# Patient Record
Sex: MALE | Race: WHITE | Employment: FULL TIME | URBAN - METROPOLITAN AREA
[De-identification: names, ages, dates, MRNs, and addresses within clinical notes are randomized per-mention and may not be internally consistent; named-entity substitution may affect disease eponyms.]

---

## 2019-08-13 ENCOUNTER — APPOINTMENT (OUTPATIENT)
Dept: LAB | Age: 29
End: 2019-08-13
Attending: NURSE PRACTITIONER
Payer: COMMERCIAL

## 2019-08-13 PROBLEM — Z79.899 HIGH RISK MEDICATION USE: Status: ACTIVE | Noted: 2017-07-28

## 2019-08-13 PROCEDURE — 36415 COLL VENOUS BLD VENIPUNCTURE: CPT | Performed by: NURSE PRACTITIONER

## 2019-08-13 PROCEDURE — 80053 COMPREHEN METABOLIC PANEL: CPT | Performed by: NURSE PRACTITIONER

## 2019-08-13 PROCEDURE — 85025 COMPLETE CBC W/AUTO DIFF WBC: CPT | Performed by: NURSE PRACTITIONER

## 2019-08-13 NOTE — PROGRESS NOTES
HPI:    Patient ID: Carrol Vazquez is a 33year old male. HPI     Recently moved to [facility] and looking to get established with provider. Was seen in [facility]. Has hx of bipolar. Notes a difference when coming off the medication from past history. and affect. His speech is normal and behavior is normal. Judgment and thought content normal. Cognition and memory are normal.   Decreased concentration   Nursing note and vitals reviewed.              ASSESSMENT/PLAN:   Bipolar 2 disorder (hcc)  (primary e

## 2019-08-13 NOTE — PATIENT INSTRUCTIONS
Contact pharmacy (local and mail order) to see where we can get the [facility] . Labs pending. Schedule appointment with PCP of choice for physical.   Follow-up sooner if needed.

## 2019-08-14 ENCOUNTER — PATIENT MESSAGE (OUTPATIENT)
Dept: FAMILY MEDICINE CLINIC | Facility: CLINIC | Age: 29
End: 2019-08-14

## 2019-08-15 NOTE — TELEPHONE ENCOUNTER
From: Chris Hui  To: ZAHIDA Florian  Sent: 8/14/2019 8:34 PM CDT  Subject: Visit Follow-up Question    I spoke with the pharmacist. She is able to get the brand of lamotragine that I need.  Apparently I'm up for a refill soon, but I will let you k

## 2019-08-21 ENCOUNTER — OFFICE VISIT (OUTPATIENT)
Dept: FAMILY MEDICINE CLINIC | Facility: CLINIC | Age: 29
End: 2019-08-21

## 2019-08-21 VITALS
SYSTOLIC BLOOD PRESSURE: 126 MMHG | BODY MASS INDEX: 27.37 KG/M2 | DIASTOLIC BLOOD PRESSURE: 82 MMHG | HEIGHT: 74 IN | WEIGHT: 213.25 LBS | TEMPERATURE: 98 F | HEART RATE: 100 BPM | RESPIRATION RATE: 18 BRPM

## 2019-08-21 DIAGNOSIS — I45.6 WPW (WOLFF-PARKINSON-WHITE SYNDROME): ICD-10-CM

## 2019-08-21 DIAGNOSIS — Z00.00 PHYSICAL EXAM: Primary | ICD-10-CM

## 2019-08-21 DIAGNOSIS — F31.81 BIPOLAR 2 DISORDER (HCC): ICD-10-CM

## 2019-08-21 DIAGNOSIS — M41.9 SCOLIOSIS OF LUMBAR SPINE, UNSPECIFIED SCOLIOSIS TYPE: ICD-10-CM

## 2019-08-21 DIAGNOSIS — Z91.09 ENVIRONMENTAL ALLERGIES: ICD-10-CM

## 2019-08-21 PROCEDURE — 99395 PREV VISIT EST AGE 18-39: CPT | Performed by: FAMILY MEDICINE

## 2019-08-21 NOTE — PATIENT INSTRUCTIONS
Continue current medications  Doing well today. No contraindication to donate plasma. Cbc and cmp done recently.

## 2019-08-21 NOTE — PROGRESS NOTES
[address] [place] [address]    Chief Complaint:   Patient presents with:  Physical  Establish Care      HPI:   Drew Kramer is a 33year old male who presents for an Annual Health Visit. Patient presents to establish care.   Has history of bipolar 2 d throat; hearing loss negative  RESPIRATORY: denies shortness of breath, wheezing or cough   CARDIOVASCULAR: denies chest pain or SPARKS; no palpitations   GI: denies nausea, vomiting, constipation, diarrhea; no rectal bleeding; no heartburn  GENITAL/: no dy and oriented x 3; affect appropriate        ASSESSMENT AND PLAN:   Sujata Barry was seen today for physical and establish care.     Diagnoses and all orders for this visit:    Physical exam    Bipolar 2 disorder ([facility] [address]    Environmental allergies    WPW (Twan

## 2019-08-28 ENCOUNTER — OFFICE VISIT (OUTPATIENT)
Dept: FAMILY MEDICINE CLINIC | Facility: CLINIC | Age: 29
End: 2019-08-28

## 2019-08-28 VITALS
BODY MASS INDEX: 27.46 KG/M2 | SYSTOLIC BLOOD PRESSURE: 130 MMHG | RESPIRATION RATE: 16 BRPM | DIASTOLIC BLOOD PRESSURE: 80 MMHG | TEMPERATURE: 97 F | HEART RATE: 86 BPM | OXYGEN SATURATION: 98 % | WEIGHT: 214 LBS | HEIGHT: 74 IN

## 2019-08-28 DIAGNOSIS — R21 RASH AND NONSPECIFIC SKIN ERUPTION: Primary | ICD-10-CM

## 2019-08-28 PROCEDURE — 99213 OFFICE O/P EST LOW 20 MIN: CPT | Performed by: FAMILY MEDICINE

## 2019-08-28 NOTE — PATIENT INSTRUCTIONS
Possibly rash from medication. Also could be due to fungal infection. Recommend to keep area dry as possible. Will consider treatment with nystatin powder if rash returns. Return to clinic if any concern.

## 2019-08-28 NOTE — PROGRESS NOTES
[facility] [place]  PROGRESS NOTE  Chief Complaint:   Patient presents with:  Rash      HPI:   This is a 30year old male presents to clinic for evaluation of her rash.   Initially patient noticed rash last week after that was changing his lamotr vomiting, constipation, diarrhea, or blood in stool. MUSCULOSKELETAL:  Denies weakness, muscle aches, back pain, joint pain, swelling or stiffness. HEMATOLOGIC:  Denies anemia, bleeding or bruising. PSYCHIATRIC:  Denies depression or anxiety.       EXAM: understanding. Patient is notified to call with any questions, complications, allergies, or worsening or changing symptoms. Patient is to call with any side effects or complications from the treatments as a result of today.      Problem List:  Patient Acti

## 2019-11-06 ENCOUNTER — PATIENT MESSAGE (OUTPATIENT)
Dept: FAMILY MEDICINE CLINIC | Facility: CLINIC | Age: 29
End: 2019-11-06

## 2019-11-08 ENCOUNTER — APPOINTMENT (OUTPATIENT)
Dept: LAB | Age: 29
End: 2019-11-08
Attending: FAMILY MEDICINE
Payer: COMMERCIAL

## 2019-11-08 DIAGNOSIS — Z91.09 ENVIRONMENTAL ALLERGIES: ICD-10-CM

## 2019-11-08 DIAGNOSIS — L50.9 HIVES: ICD-10-CM

## 2019-11-08 PROCEDURE — 86003 ALLG SPEC IGE CRUDE XTRC EA: CPT

## 2019-11-08 PROCEDURE — 36415 COLL VENOUS BLD VENIPUNCTURE: CPT

## 2019-11-08 PROCEDURE — 82785 ASSAY OF IGE: CPT

## 2019-11-08 NOTE — PATIENT INSTRUCTIONS
From filled out for [employer]. Check allergy testing today. Have benadryl available if symptoms returns. Go to ER if symptoms worse. Return to clinic if any concern.

## 2019-11-08 NOTE — PROGRESS NOTES
[address]  PROGRESS NOTE  Chief Complaint:   Patient presents with: Other: Discuss [employer]      HPI:   This is a 30year old male presents to clinic to have form filled out for plasma donation.   Patient has history of bipolar 2 disorde lesion, or excessive skin dryness. CARDIOVASCULAR:  Denies chest pain, chest pressure, chest discomfort, palpitations, edema, dyspnea on exertion or at rest.  RESPIRATORY:  Denies shortness of breath, wheezing, cough or sputum.   GASTROINTESTINAL:  Denies PSYCHIATRIC: alert and oriented x 3; affect appropriate          ASSESSMENT AND PLAN:   Carlos Pelletier was seen today for other.     Diagnoses and all orders for this visit:    Bipolar 2 disorder ([facility] [address]    Environmental allergies  -     ALLERGY REGION 8; Future  -

## 2019-11-11 ENCOUNTER — TELEPHONE (OUTPATIENT)
Dept: FAMILY MEDICINE CLINIC | Facility: CLINIC | Age: 29
End: 2019-11-11

## 2019-11-11 NOTE — TELEPHONE ENCOUNTER
----- Message from Feng Wilson MD sent at 11/11/2019  4:14 PM CST -----  Please inform patient that food allergy profile is negative. He has allergies to Dog dander, fungus, dust mites, Ragweed  Recommend to return to clinic if symptoms worse.

## 2019-11-11 NOTE — TELEPHONE ENCOUNTER
Message left on confirmed voicemail with results and recommendations as per Dr. Kevin Swenson. Pt encouraged to call back with any questions.

## 2019-12-10 ENCOUNTER — OFFICE VISIT (OUTPATIENT)
Dept: FAMILY MEDICINE CLINIC | Facility: CLINIC | Age: 29
End: 2019-12-10

## 2019-12-10 VITALS
DIASTOLIC BLOOD PRESSURE: 76 MMHG | HEART RATE: 78 BPM | WEIGHT: 219 LBS | TEMPERATURE: 97 F | BODY MASS INDEX: 28 KG/M2 | SYSTOLIC BLOOD PRESSURE: 112 MMHG | OXYGEN SATURATION: 98 %

## 2019-12-10 DIAGNOSIS — R05.9 COUGH: Primary | ICD-10-CM

## 2019-12-10 PROBLEM — Z79.899 HIGH RISK MEDICATION USE: Status: RESOLVED | Noted: 2017-07-28 | Resolved: 2019-12-10

## 2019-12-10 PROCEDURE — 99213 OFFICE O/P EST LOW 20 MIN: CPT | Performed by: NURSE PRACTITIONER

## 2019-12-10 RX ORDER — BENZONATATE 200 MG/1
200 CAPSULE ORAL 3 TIMES DAILY PRN
Qty: 50 CAPSULE | Refills: 0 | Status: SHIPPED | OUTPATIENT
Start: 2019-12-10 | End: 2019-12-30

## 2019-12-10 NOTE — PROGRESS NOTES
HPI:    Patient ID: Ivonne Oconnor is a 30year old male. HPI     Cough for the past 6 days that is not getting better. Has tried cough drops and NyQuil if unable to sleep. States otherwise feels ok. Slight PND.    No fever, chills, ear pain, or ST.   Nolon Kail Neck: Normal range of motion. Neck supple. No thyromegaly present. Cardiovascular: Normal rate, regular rhythm and normal heart sounds. Exam reveals no friction rub. No murmur heard.   Pulmonary/Chest: Effort normal and breath sounds normal. No respirat Take this medicine by mouth with a glass of water. Follow the directions on the prescription label. Avoid breaking, chewing, or sucking the capsule, as this can cause serious side effects. Take your medicine at regular intervals.  Do not take your medicine · an unusual or allergic reaction to benzonatate, anesthetics, other medicines, foods, dyes, or preservatives  · pregnant or trying to get pregnant  · breast-feeding  What should I watch for while using this medicine?   Tell your doctor if your symptoms do

## 2019-12-10 NOTE — PATIENT INSTRUCTIONS
Take Tessalon three times a day as needed for the cough.      Take acetaminophen or ibuprofen for fever/discomfort  Drink plenty of fluids, warm liquids  Decongestants for congestion  Expectorant and/or cough suppressant  Use saline drops as needed or use n Carbex, Eldepryl, Marplan, Nardil, and Parnate  What if I miss a dose? If you miss a dose, take it as soon as you can. If it is almost time for your next dose, take only that dose. Do not take double or extra doses. Where should I keep my medicine?   Keep

## 2019-12-30 ENCOUNTER — OFFICE VISIT (OUTPATIENT)
Dept: FAMILY MEDICINE CLINIC | Facility: CLINIC | Age: 29
End: 2019-12-30

## 2019-12-30 VITALS
WEIGHT: 218.38 LBS | RESPIRATION RATE: 18 BRPM | DIASTOLIC BLOOD PRESSURE: 72 MMHG | TEMPERATURE: 97 F | HEART RATE: 84 BPM | OXYGEN SATURATION: 95 % | HEIGHT: 74 IN | SYSTOLIC BLOOD PRESSURE: 116 MMHG | BODY MASS INDEX: 28.03 KG/M2

## 2019-12-30 DIAGNOSIS — J98.01 BRONCHOSPASM, ACUTE: Primary | ICD-10-CM

## 2019-12-30 PROCEDURE — 99213 OFFICE O/P EST LOW 20 MIN: CPT | Performed by: NURSE PRACTITIONER

## 2019-12-30 RX ORDER — ALBUTEROL SULFATE 90 UG/1
2 AEROSOL, METERED RESPIRATORY (INHALATION) EVERY 4 HOURS PRN
Qty: 1 INHALER | Refills: 0 | Status: SHIPPED | OUTPATIENT
Start: 2019-12-30 | End: 2021-04-08

## 2019-12-30 RX ORDER — BENZONATATE 200 MG/1
200 CAPSULE ORAL 3 TIMES DAILY PRN
Qty: 50 CAPSULE | Refills: 0 | Status: SHIPPED | OUTPATIENT
Start: 2019-12-30 | End: 2020-05-01 | Stop reason: ALTCHOICE

## 2019-12-30 RX ORDER — EPINEPHRINE 0.3 MG/.3ML
0.3 INJECTION SUBCUTANEOUS ONCE
Qty: 1 EACH | Refills: 0 | Status: SHIPPED | OUTPATIENT
Start: 2019-12-30 | End: 2019-12-30

## 2019-12-30 NOTE — PROGRESS NOTES
HPI:    Patient ID: Enola Sacks is a 30year old male. HPI     States that the cough remains from 2 weeks ago. States that in general he feels well. States the Tessalon helps the cough until it wears off. Cough is dry. No congestion noted.    No GI i canal normal.   Left Ear: Hearing, tympanic membrane, external ear and ear canal normal.   Nose: Mucosal edema (left > right) present. Right sinus exhibits no maxillary sinus tenderness and no frontal sinus tenderness.  Left sinus exhibits no maxillary sinu Follow-up if not improving - consider PFT and/or chest x-ray. Otherwise follow-up as needed.             MO#[id number]

## 2019-12-30 NOTE — PATIENT INSTRUCTIONS
Use inhaler every 4 hours as needed. Refill done on epipen and tessalon. Follow-up if not improving - consider PFT and/or chest x-ray. Otherwise follow-up as needed.

## 2020-01-14 NOTE — TELEPHONE ENCOUNTER
Albuteral Inhaler LR - 12/30/19, 1 Inhaler, 0 refills    LOV - 12/30/19 Cough   8/21/19 Physical with Dr. Isabell Schwarz - Not Scheduled    Future appt:    Last Appointment with provider:   12/30/2019  Last appointment at [facility]:  12/30/2019  No results

## 2020-01-15 NOTE — TELEPHONE ENCOUNTER
Patient was given an inhaler a couple of weeks ago. Please clarify about the need for another inhaler - it's really too soon. Thank you.

## 2020-01-27 NOTE — TELEPHONE ENCOUNTER
Future appt:    Last Appointment with provider:   12/30/2019  Last appointment at [facility]:  12/30/2019  No results found for: CHOLEST, HDL, LDL, TRIGLY, TRIG  No results found for: EAG, A1C  No results found for: T4F, TSH, TSHT4    No follow-ups on fi

## 2020-01-27 NOTE — TELEPHONE ENCOUNTER
Please clarify if patient really needs a refill on his albuterol. If he has used 1 inhaler in the past month, needs follow-up to do the PFT and chest x-ray.

## 2020-02-07 ENCOUNTER — PATIENT MESSAGE (OUTPATIENT)
Dept: FAMILY MEDICINE CLINIC | Facility: CLINIC | Age: 30
End: 2020-02-07

## 2020-02-08 NOTE — TELEPHONE ENCOUNTER
Dr. Britney Reese can you please advise on patient's below request.     Future appt:    Last Appointment with provider: 8/21/19 physical; return in 6 months for f/u  Last appointment at [facility]:  12/30/2019  No results found for: Hayde Morillo, HDL, LDL, Mirian Sprague, T

## 2020-02-08 NOTE — TELEPHONE ENCOUNTER
From: Maria Isabel Rodriguez  To: Iban Granados, APRN  Sent: 2/7/2020 11:21 AM CST  Subject: Prescription Question    Can you please approve my Lamictal ASAP?  I'm leaving for a conference on Monday and I need to fill it before then

## 2020-02-10 RX ORDER — LAMOTRIGINE 200 MG/1
200 TABLET ORAL
Qty: 30 TABLET | Refills: 0 | Status: SHIPPED | OUTPATIENT
Start: 2020-02-10 | End: 2020-03-19

## 2020-02-10 NOTE — TELEPHONE ENCOUNTER
I have sent refill. Please ask patient if he is seeing psychiatrist currently or not for future refills.

## 2020-02-18 ENCOUNTER — PATIENT MESSAGE (OUTPATIENT)
Dept: FAMILY MEDICINE CLINIC | Facility: CLINIC | Age: 30
End: 2020-02-18

## 2020-02-18 NOTE — TELEPHONE ENCOUNTER
From: Maria Isabel Rodriguez  To: Araceli New MD  Sent: 2/18/2020 10:20 AM CST  Subject: Referral Request    Can I please get a referral to a psychiatrist for my MARES consult? I'm assuming there's someone within our Network and in our area?     Also I just realized t

## 2020-02-19 NOTE — TELEPHONE ENCOUNTER
From: Emiliano Castellanos  To: Manfred Gasca MD  Sent: 2/18/2020 1:12 PM CST  Subject: Referral Request    Will you please refer me to an in-network psychiatrist in [facility] as discussed? Can you also please refer me to a new cardiologist within our network?

## 2020-03-19 RX ORDER — LAMOTRIGINE 200 MG/1
TABLET ORAL
Qty: 90 TABLET | Refills: 0 | Status: SHIPPED | OUTPATIENT
Start: 2020-03-19 | End: 2020-06-15

## 2020-03-19 NOTE — TELEPHONE ENCOUNTER
Future appt:     Your appointments     Date & Time Appointment Department [facility])    Apr 08, 2020  2:00 PM CDT [facility] EVAL with Yamilex Sandoval PA-C [address] (Jodee Camejo )            Thony Guevara

## 2020-05-01 ENCOUNTER — TELEPHONE (OUTPATIENT)
Dept: FAMILY MEDICINE CLINIC | Facility: CLINIC | Age: 30
End: 2020-05-01

## 2020-05-01 ENCOUNTER — OFFICE VISIT (OUTPATIENT)
Dept: FAMILY MEDICINE CLINIC | Facility: CLINIC | Age: 30
End: 2020-05-01

## 2020-05-01 VITALS
HEART RATE: 68 BPM | DIASTOLIC BLOOD PRESSURE: 60 MMHG | SYSTOLIC BLOOD PRESSURE: 80 MMHG | TEMPERATURE: 98 F | WEIGHT: 198 LBS | BODY MASS INDEX: 25.41 KG/M2 | HEIGHT: 74 IN | OXYGEN SATURATION: 98 % | RESPIRATION RATE: 16 BRPM

## 2020-05-01 DIAGNOSIS — H83.09 LABYRINTHITIS, UNSPECIFIED LATERALITY: Primary | ICD-10-CM

## 2020-05-01 PROCEDURE — 99214 OFFICE O/P EST MOD 30 MIN: CPT | Performed by: NURSE PRACTITIONER

## 2020-05-01 RX ORDER — MECLIZINE HCL 12.5 MG/1
12.5 TABLET ORAL 3 TIMES DAILY PRN
Qty: 30 TABLET | Refills: 0 | Status: SHIPPED | OUTPATIENT
Start: 2020-05-01 | End: 2021-04-08

## 2020-05-01 RX ORDER — EPINEPHRINE 0.3 MG/.3ML
INJECTION SUBCUTANEOUS
COMMUNITY
Start: 2019-12-30 | End: 2021-04-08

## 2020-05-01 NOTE — TELEPHONE ENCOUNTER
Patient agreed and will come into the office for visit.      Future Appointments   Date Time Provider Phyllis Nielsen   5/1/2020  1:30 PM ZAHIDA [facility] [place]   7/10/2020 12:30 PM Octavia Blair PA-C [address] [place] [address]

## 2020-05-01 NOTE — PATIENT INSTRUCTIONS
Discussed that labyrinthitis is an infection of the inner ear- typically it is viral and just takes time to resolve. You should turn your head slowly, and not make any sudden movements.   Also avoid motion rides such as roller coasters, and going upside suresh

## 2020-05-01 NOTE — TELEPHONE ENCOUNTER
Patient states that he is very lightheaded and dizzy today. Patient states that this started last night and then this morning he woke up and was spinning. Patient denied any fainting or loss of consciences. Requested a phone visit.      Drew Kramer

## 2020-05-01 NOTE — PROGRESS NOTES
Lauren Larson is a 30year old male. Patient presents with:  Dizziness: x 1 day      HPI:   Complaints of dizzy - felt like he was \"wasted\" room spinning. Patient states he was not inebriated in any way. No tunnel vision.    Last night symptoms started Depression Mother    • Depression Father    • Other (lung cancer) Maternal Grandmother    • Depression Paternal Grandmother    • Depression Paternal Grandfather    • Schizophrenia Maternal Uncle    • Depression Paternal Uncle    • Depression Paternal Uncle encounter. Meds & Refills for this Visit:  Requested Prescriptions     Signed Prescriptions Disp Refills   • Meclizine HCl 12.5 MG Oral Tab 30 tablet 0     Sig: Take 1 tablet (12.5 mg total) by mouth 3 (three) times daily as needed for Dizziness.

## 2020-05-27 ENCOUNTER — PATIENT MESSAGE (OUTPATIENT)
Dept: FAMILY MEDICINE CLINIC | Facility: CLINIC | Age: 30
End: 2020-05-27

## 2020-05-28 ENCOUNTER — VIRTUAL PHONE E/M (OUTPATIENT)
Dept: FAMILY MEDICINE CLINIC | Facility: CLINIC | Age: 30
End: 2020-05-28

## 2020-05-28 VITALS — HEIGHT: 73 IN | BODY MASS INDEX: 25.98 KG/M2 | WEIGHT: 196 LBS

## 2020-05-28 DIAGNOSIS — N20.0 BILATERAL KIDNEY STONES: Primary | ICD-10-CM

## 2020-05-28 PROCEDURE — 99213 OFFICE O/P EST LOW 20 MIN: CPT | Performed by: FAMILY MEDICINE

## 2020-05-28 NOTE — TELEPHONE ENCOUNTER
From: Edgardo Smyth  To: Yasmine Bedolla MD  Sent: 5/27/2020 5:37 PM CDT  Subject: Referral Request    Went to ER for pain in right flank, diagnosed with kidney stone. Requesting in-network urologist for follow up.

## 2020-05-28 NOTE — PATIENT INSTRUCTIONS
-Recommend to continue with plenty of fluids, avoid holding urine for long. Time. Follow-up with urologist.  Call back if any pain or any concern.

## 2020-05-28 NOTE — TELEPHONE ENCOUNTER
Edgardo Smyth  verbally consents to a Virtual/Telephone Check-In service on 5/28/2020. Patient understands and accepts financial responsibility for any deductible, co-insurance and/or co-pays associated with this service.       Future Appointments   Date T

## 2020-05-28 NOTE — PROGRESS NOTES
Franklyn Pearson  verbally consents to a Virtual/Telephone Check-In service on 5/28/2020. Patient understands and accepts financial responsibility for any deductible, co-insurance and/or co-pays associated with this service.     Duration of the service: 6 min if any pain or any concern. This note was created utilizing Dragon speech recognition software.  Please excuse any grammatical errors. Call my office if you have any questions regarding this note.

## 2020-05-29 ENCOUNTER — TELEPHONE (OUTPATIENT)
Dept: SURGERY | Facility: CLINIC | Age: 30
End: 2020-05-29

## 2020-05-29 PROBLEM — N20.0 RECURRENT KIDNEY STONES: Status: ACTIVE | Noted: 2020-05-29

## 2020-05-29 NOTE — TELEPHONE ENCOUNTER
I left  for patient's spouse (this is the only number listed in his chart) asking the patient to bring a copy of his CT images so that I can review at his appointment on Monday.

## 2020-05-29 NOTE — H&P
HPI:     Ofelia Owens is a 30year old male with a PMH of asthma, Joselyn-Parkinson-White, scoliosis. He presents as a consult from Dr Ruthann Ahumada office with bilateral kidney stones. He feels well today and has no complaints.  He went to the ER on 5/27/20 w Father    • Other (lung cancer) Maternal Grandmother    • Depression Paternal Grandmother    • Depression Paternal Grandfather    • Schizophrenia Maternal Uncle    • Depression Paternal Uncle    • Depression Paternal Uncle    • Depression Paternal Uncle obvious goiter or masses  LUNGS: nonlabored breathing  ABDOMEN: soft, no obvious masses or tenderness  SKIN: no obvious rashes       ASSESSMENT/PLAN:   Diagnoses and all orders for this visit:    Recurrent kidney stones  -     URINALYSIS, AUTO, W/O SCOPE

## 2020-06-01 ENCOUNTER — OFFICE VISIT (OUTPATIENT)
Dept: SURGERY | Facility: CLINIC | Age: 30
End: 2020-06-01

## 2020-06-01 DIAGNOSIS — N20.0 RECURRENT KIDNEY STONES: Primary | ICD-10-CM

## 2020-06-01 PROCEDURE — 99243 OFF/OP CNSLTJ NEW/EST LOW 30: CPT | Performed by: UROLOGY

## 2020-06-01 PROCEDURE — 81003 URINALYSIS AUTO W/O SCOPE: CPT | Performed by: UROLOGY

## 2020-06-15 RX ORDER — LAMOTRIGINE 200 MG/1
TABLET ORAL
Qty: 90 TABLET | Refills: 0 | Status: SHIPPED | OUTPATIENT
Start: 2020-06-15 | End: 2020-10-07

## 2020-06-15 NOTE — TELEPHONE ENCOUNTER
Future appt:     Your appointments     Date & Time Appointment Department [facility])    Jul 10, 2020 12:30 PM CDT Follow up with Miriam Howell PA-C [address] ([place]odee Camejo )            [facility]

## 2020-09-16 RX ORDER — LAMOTRIGINE 200 MG/1
TABLET ORAL
Qty: 90 TABLET | Refills: 0 | OUTPATIENT
Start: 2020-09-16

## 2020-09-16 NOTE — TELEPHONE ENCOUNTER
Future appt: Your appointments     Date & Time Appointment Department [facility])    Oct 07, 2020  5:00 PM CDT Follow up with Konstantin Oliveros PA-C [address] ([facility] )            [facility] [address] [place] [id number] [phone]        Last Appointment with provider:   Visit date not found  Last appointment at [facility]:  5/1/2020  No results found for: CHOLEST, HDL, LDL, TRIGLY, TRIG  No results found for: EAG, A1C  No results found for: T4F, TSH, TSHT4    No follow-ups on file.

## 2020-09-22 ENCOUNTER — PATIENT MESSAGE (OUTPATIENT)
Dept: FAMILY MEDICINE CLINIC | Facility: CLINIC | Age: 30
End: 2020-09-22

## 2020-09-22 RX ORDER — MONTELUKAST SODIUM 10 MG/1
10 TABLET ORAL DAILY
Qty: 30 TABLET | Refills: 2 | Status: SHIPPED | OUTPATIENT
Start: 2020-09-22 | End: 2021-04-08

## 2020-09-22 NOTE — TELEPHONE ENCOUNTER
Future appt:     Your appointments     Date & Time Appointment Department [facility])    Oct 07, 2020  5:00 PM CDT Follow up with Meseret Antony PA-C [address] (F[place]ina [place]jo )            [facility] [facility]

## 2020-09-22 NOTE — TELEPHONE ENCOUNTER
From: Willam Wolfe  To: Tim Syed MD  Sent: 9/22/2020 9:59 AM CDT  Subject: Prescription Question    Can I please have a refill of my montelukast? Allergy season hit hard and early this year for me and I'll be out sooner than expected.

## 2021-04-08 ENCOUNTER — OFFICE VISIT (OUTPATIENT)
Dept: FAMILY MEDICINE CLINIC | Facility: CLINIC | Age: 31
End: 2021-04-08
Payer: COMMERCIAL

## 2021-04-08 DIAGNOSIS — R14.0 ABDOMINAL BLOATING: Primary | ICD-10-CM

## 2021-04-08 PROCEDURE — 3074F SYST BP LT 130 MM HG: CPT | Performed by: NURSE PRACTITIONER

## 2021-04-08 PROCEDURE — 3079F DIAST BP 80-89 MM HG: CPT | Performed by: NURSE PRACTITIONER

## 2021-04-08 PROCEDURE — 99213 OFFICE O/P EST LOW 20 MIN: CPT | Performed by: NURSE PRACTITIONER

## 2021-04-08 RX ORDER — EPINEPHRINE 0.3 MG/.3ML
0.3 INJECTION SUBCUTANEOUS AS NEEDED
Qty: 1 EACH | Refills: 1 | Status: SHIPPED | OUTPATIENT
Start: 2021-04-08

## 2021-04-08 NOTE — PATIENT INSTRUCTIONS
Refill of Epi pen given for emergencies, keep on you for emergencies. Miralax 17 gram mixed in 4-6 ounces of water, take hourly for UP TO three doses until large bowel movement.    Increase fluid intake, avoid dairy products  Continue with high fiber leila

## 2021-04-09 VITALS
RESPIRATION RATE: 16 BRPM | HEART RATE: 77 BPM | DIASTOLIC BLOOD PRESSURE: 82 MMHG | TEMPERATURE: 98 F | SYSTOLIC BLOOD PRESSURE: 118 MMHG | OXYGEN SATURATION: 97 %

## 2021-04-09 NOTE — PROGRESS NOTES
CHIEF COMPLAINT:   Patient presents with:  Abdominal Pain  Other: loose stool      HPI:   Dave Seen is a 35year old male who presents to [address] [place] [address] today with complaints of abdominal discomfort.     Symptoms started on: 04/02/21    Has had Vaping Use: Never used    Substance and Sexual Activity      Alcohol use: Not Currently        Comment: Denies alcohol use currently      Drug use: Not Currently        Types: Cannabis        Comment: Tried edible of cannabis once in college       REVIE with abdominal imaging if no improvement. Patient indicated that he has a current EpiPen which has  and does not have any additional refills on hand. Patient with documented anaphylaxis to sunflower seeds and sunflower oil.   Patient also has Gigwalk

## 2021-05-24 ENCOUNTER — PATIENT MESSAGE (OUTPATIENT)
Dept: FAMILY MEDICINE CLINIC | Facility: CLINIC | Age: 31
End: 2021-05-24

## 2021-05-24 RX ORDER — MONTELUKAST SODIUM 10 MG/1
10 TABLET ORAL DAILY
Qty: 30 TABLET | Refills: 0 | Status: SHIPPED | OUTPATIENT
Start: 2021-05-24 | End: 2021-06-11

## 2021-05-24 NOTE — TELEPHONE ENCOUNTER
From: Sadia Flor  To: Yasmine Bedolla MD  Sent: 5/24/2021 7:29 AM CDT  Subject: Prescription Question    Singulair used to be on my medication list and it's not anymore. I requested a refill a few weeks ago but I haven't received a response.

## 2021-06-11 ENCOUNTER — OFFICE VISIT (OUTPATIENT)
Dept: FAMILY MEDICINE CLINIC | Facility: CLINIC | Age: 31
End: 2021-06-11
Payer: COMMERCIAL

## 2021-06-11 ENCOUNTER — LAB ENCOUNTER (OUTPATIENT)
Dept: LAB | Age: 31
End: 2021-06-11
Attending: NURSE PRACTITIONER
Payer: COMMERCIAL

## 2021-06-11 VITALS
RESPIRATION RATE: 16 BRPM | HEART RATE: 80 BPM | SYSTOLIC BLOOD PRESSURE: 122 MMHG | TEMPERATURE: 97 F | DIASTOLIC BLOOD PRESSURE: 64 MMHG | WEIGHT: 204 LBS | OXYGEN SATURATION: 99 % | BODY MASS INDEX: 26.18 KG/M2 | HEIGHT: 74 IN

## 2021-06-11 DIAGNOSIS — Z91.09 ENVIRONMENTAL ALLERGIES: ICD-10-CM

## 2021-06-11 DIAGNOSIS — R05.9 COUGH: ICD-10-CM

## 2021-06-11 DIAGNOSIS — Z00.00 ROUTINE HEALTH MAINTENANCE: Primary | ICD-10-CM

## 2021-06-11 DIAGNOSIS — I45.6 WPW (WOLFF-PARKINSON-WHITE SYNDROME): ICD-10-CM

## 2021-06-11 PROCEDURE — 3074F SYST BP LT 130 MM HG: CPT | Performed by: NURSE PRACTITIONER

## 2021-06-11 PROCEDURE — 80061 LIPID PANEL: CPT | Performed by: NURSE PRACTITIONER

## 2021-06-11 PROCEDURE — 99395 PREV VISIT EST AGE 18-39: CPT | Performed by: NURSE PRACTITIONER

## 2021-06-11 PROCEDURE — 3078F DIAST BP <80 MM HG: CPT | Performed by: NURSE PRACTITIONER

## 2021-06-11 PROCEDURE — 3008F BODY MASS INDEX DOCD: CPT | Performed by: NURSE PRACTITIONER

## 2021-06-11 PROCEDURE — 84439 ASSAY OF FREE THYROXINE: CPT | Performed by: NURSE PRACTITIONER

## 2021-06-11 PROCEDURE — 80050 GENERAL HEALTH PANEL: CPT | Performed by: NURSE PRACTITIONER

## 2021-06-11 RX ORDER — MONTELUKAST SODIUM 10 MG/1
10 TABLET ORAL DAILY
Qty: 90 TABLET | Refills: 3 | Status: SHIPPED | OUTPATIENT
Start: 2021-06-11

## 2021-06-11 NOTE — PROGRESS NOTES
[facility]      HPI:   Nadeem Kim is a 35year old male who presents for an Annual Health Visit. Annual wellness. Will be participating in running [employer] [facility] [employer] summer camp in [place]. Tdap up to date, 09/2015.   Covid vaccine up to Grandmother    • Depression Paternal Grandmother    • Depression Paternal Grandfather    • Schizophrenia Maternal Uncle    • Depression Paternal Uncle    • Depression Paternal Uncle    • Depression Paternal Uncle    • Depression Paternal Uncle       SOCIAL kg)    Estimated body mass index is 26.19 kg/m² as calculated from the following:    Height as of this encounter: 6' 2\" (1.88 m). Weight as of this encounter: 204 lb (92.5 kg).     GENERAL: well developed, well nourished, in no apparent distress  SKIN: (Joselyn-Parkinson-White syndrome)  Asymptomatic, was incidental finding years ago.   Due for routine follow up with Dr. Nohelia Newsome consult placed, schedule routine follow up appointment  -     CARDIO - EXTERNAL    Patient Instructions   Year refill of dasia

## 2021-06-11 NOTE — PATIENT INSTRUCTIONS
Year refill of singulair. Fasting labs today. Tetanus from 2015, copy of immunizations given. Recommend pneumococcal vaccine, okay to wait until done with summer camp.    Schedule routine follow up with Dr. Janette Palma for WPW>   Routine wellness in 1 yea

## 2021-06-12 ENCOUNTER — TELEPHONE (OUTPATIENT)
Dept: FAMILY MEDICINE CLINIC | Facility: CLINIC | Age: 31
End: 2021-06-12

## 2021-06-12 NOTE — TELEPHONE ENCOUNTER
----- Message from ZAHIDA Matamoros sent at 6/12/2021  8:13 AM CDT -----  Fasting labs without sign of diabetes. Liver and kidney function stable. Cholesterol panel looks great. Thyroid in normal range.    CBC with minimally lower red blood cell

## 2022-05-16 RX ORDER — MONTELUKAST SODIUM 10 MG/1
10 TABLET ORAL DAILY
Qty: 90 TABLET | Refills: 0 | Status: SHIPPED | OUTPATIENT
Start: 2022-05-16

## 2022-05-16 NOTE — TELEPHONE ENCOUNTER
Future appt: Your appointments     Date & Time Appointment Department [facility])    Jun 20, 2022  4:30 PM CDT Video Visit with HILLARY [address] ([facility] )    Please verify your telehealth insurance benefits prior to your appointment. You must be in the state of [place] during the virtual visit. Please use the [employer] Mobile Yakov and launch the video visit 10 minutes prior to your scheduled appointment time to ensure your camera and microphone are working properly. Once the video visit has started you will be placed in a waiting room until the provider begins the visit. You will receive an email confirmation with instructions. If you have questions, call your doctor's office directly. If you are having issues or need to use a desktop/laptop, please follow the below steps:        1. Close out all other open apps (could be competing for audio resources)  2. Disable Bluetooth  3.       Reboot mobile device before joining the video  4. Come off Wi-Fi and switch over to Data    Please see our Video Visit Tip Sheet if you need additional assistance. If you believe this is an emergency, please dial 911 immediately. [place] [address] [place] [address]  [phone]         Last Appointment with provider:  6/11/2021; Return in about 1 year (around 6/11/2022) for Annual Wellness. [facility]  Visit date not found  Cholesterol, Total (mg/dL)   Date Value   06/11/2021 176     HDL Cholesterol (mg/dL)   Date Value   06/11/2021 46     LDL Cholesterol (mg/dL)   Date Value   06/11/2021 112 (H)     Triglycerides (mg/dL)   Date Value   06/11/2021 101     No results found for: EAG, A1C  Lab Results   Component Value Date    T4F 0.9 06/11/2021    TSH 0.529 06/11/2021     Last RF:  6/11/2021    No follow-ups on file.

## 2022-06-03 RX ORDER — EPINEPHRINE 0.3 MG/.3ML
0.3 INJECTION SUBCUTANEOUS AS NEEDED
Qty: 1 EACH | Refills: 1 | Status: SHIPPED | OUTPATIENT
Start: 2022-06-03

## 2022-06-03 NOTE — TELEPHONE ENCOUNTER
Future appt: Your appointments     Date & Time Appointment Department [facility])    Jun 20, 2022  4:30 PM CDT Video Visit with HILL[facility] [address] [facility]    Please verify your telehealth insurance benefits prior to your appointment. You must be in the state of [place] during the virtual visit. Please use the [employer] Mobile Yakov and launch the video visit 10 minutes prior to your scheduled appointment time to ensure your camera and microphone are working properly. Once the video visit has started you will be placed in a waiting room until the provider begins the visit. You will receive an email confirmation with instructions. If you have questions, call your doctor's office directly. If you are having issues or need to use a desktop/laptop, please follow the below steps:        1. Close out all other open apps (could be competing for audio resources)  2. Disable Bluetooth  3.       Reboot mobile device before joining the video  4. Come off Wi-Fi and switch over to Data    Please see our Video Visit Tip Sheet if you need additional assistance. If you believe this is an emergency, please dial 911 immediately. Jun 29, 2022  8:00 AM CDT Adult Physical with ZAHIDA Davison [facility]    PLEASE NOTE - Most insurances allow a Complete Physical once every 366 days. Please schedule accordingly. Please arrive 15 minutes prior to your scheduled appointment. Please also bring your Insurance card, Photo ID, and your medication bottles or a list of your current medication. If you no longer require this appointment, please contact your physician office to cancel.           [facility] [id number]  [address] [facility] [address]  [address] [facility] [address] [phone]        Last Appointment with provider:   Visit date not found  [facility]  Visit date not found  Cholesterol, Total (mg/dL)   Date Value   06/11/2021 176     HDL Cholesterol (mg/dL)   Date Value   06/11/2021 46     LDL Cholesterol (mg/dL)   Date Value   06/11/2021 112 (H)     Triglycerides (mg/dL)   Date Value   06/11/2021 101     No results found for: EAG, A1C  Lab Results   Component Value Date    T4F 0.9 06/11/2021    TSH 0.529 06/11/2021     Last RF:  4/8/2021    No follow-ups on file.

## 2022-06-28 ENCOUNTER — TELEPHONE (OUTPATIENT)
Dept: FAMILY MEDICINE CLINIC | Facility: CLINIC | Age: 32
End: 2022-06-28

## 2022-06-28 NOTE — TELEPHONE ENCOUNTER
patient has an appt tomorrow, has cough, shortness of breath, red eye, negative covid test   Future Appointments   Date Time Provider Phyllis Nielsen   6/29/2022  8:00 AM ZAHIDA [facility] [place]   9/21/2022  4:30 PM Yimi Suazo PA-C [facility]

## 2022-06-29 ENCOUNTER — OFFICE VISIT (OUTPATIENT)
Dept: FAMILY MEDICINE CLINIC | Facility: CLINIC | Age: 32
End: 2022-06-29
Payer: COMMERCIAL

## 2022-06-29 VITALS
RESPIRATION RATE: 16 BRPM | BODY MASS INDEX: 26.98 KG/M2 | TEMPERATURE: 98 F | HEART RATE: 82 BPM | SYSTOLIC BLOOD PRESSURE: 122 MMHG | HEIGHT: 73.5 IN | DIASTOLIC BLOOD PRESSURE: 80 MMHG | WEIGHT: 208 LBS | OXYGEN SATURATION: 98 %

## 2022-06-29 DIAGNOSIS — R05.9 COUGH: ICD-10-CM

## 2022-06-29 DIAGNOSIS — Z91.09 ENVIRONMENTAL ALLERGIES: ICD-10-CM

## 2022-06-29 DIAGNOSIS — I45.6 WPW (WOLFF-PARKINSON-WHITE SYNDROME): ICD-10-CM

## 2022-06-29 DIAGNOSIS — J06.9 VIRAL UPPER RESPIRATORY TRACT INFECTION: ICD-10-CM

## 2022-06-29 DIAGNOSIS — Z00.00 ENCOUNTER FOR ANNUAL HEALTH EXAMINATION: Primary | ICD-10-CM

## 2022-06-29 PROBLEM — N20.0 RECURRENT KIDNEY STONES: Status: RESOLVED | Noted: 2020-05-29 | Resolved: 2022-06-29

## 2022-06-29 PROCEDURE — 99213 OFFICE O/P EST LOW 20 MIN: CPT | Performed by: NURSE PRACTITIONER

## 2022-06-29 PROCEDURE — 3008F BODY MASS INDEX DOCD: CPT | Performed by: NURSE PRACTITIONER

## 2022-06-29 PROCEDURE — 3074F SYST BP LT 130 MM HG: CPT | Performed by: NURSE PRACTITIONER

## 2022-06-29 PROCEDURE — 3079F DIAST BP 80-89 MM HG: CPT | Performed by: NURSE PRACTITIONER

## 2022-06-29 PROCEDURE — 99395 PREV VISIT EST AGE 18-39: CPT | Performed by: NURSE PRACTITIONER

## 2022-06-29 RX ORDER — ALBUTEROL SULFATE 90 UG/1
2 AEROSOL, METERED RESPIRATORY (INHALATION) EVERY 6 HOURS PRN
Qty: 1 EACH | Refills: 0 | Status: SHIPPED | OUTPATIENT
Start: 2022-06-29

## 2022-06-29 RX ORDER — BENZONATATE 100 MG/1
100 CAPSULE ORAL 3 TIMES DAILY PRN
Qty: 12 CAPSULE | Refills: 0 | Status: SHIPPED | OUTPATIENT
Start: 2022-06-29

## 2022-06-29 RX ORDER — TOBRAMYCIN 3 MG/ML
SOLUTION/ DROPS OPHTHALMIC
COMMUNITY
Start: 2022-06-24

## 2022-06-29 RX ORDER — FLUTICASONE PROPIONATE 50 MCG
1 SPRAY, SUSPENSION (ML) NASAL DAILY
COMMUNITY

## 2022-06-29 RX ORDER — MONTELUKAST SODIUM 10 MG/1
10 TABLET ORAL DAILY
Qty: 90 TABLET | Refills: 3 | Status: SHIPPED | OUTPATIENT
Start: 2022-06-29

## 2022-06-29 NOTE — TELEPHONE ENCOUNTER
Patient is here for an appt with Clarisa Buck. Complains of persistent dry cough and PND x8 days. Patient has taken 3 home Covid tests that all came back Negative. Clarisa Buck made aware.

## 2022-06-29 NOTE — PATIENT INSTRUCTIONS
Fasting labs in next few days.     Refill on montelukast    Epi pen up to date    Refill of albuterol; side effects reviewed  Tessalon pearles 1 every 8 hours if needed for cough; side effects reviewed  Okay for mucinex DM if needed  Can try zyrtec or other daily antihistamine to help with nasal drip  Continue flonase  Steam, humidifier by bedside, nasal irrigation/saline    Physical in one year otherwise

## 2022-07-01 ENCOUNTER — LABORATORY ENCOUNTER (OUTPATIENT)
Dept: LAB | Age: 32
End: 2022-07-01
Attending: NURSE PRACTITIONER
Payer: COMMERCIAL

## 2022-07-01 DIAGNOSIS — Z00.00 ENCOUNTER FOR ANNUAL HEALTH EXAMINATION: ICD-10-CM

## 2022-07-01 LAB
ALBUMIN SERPL-MCNC: 4 G/DL (ref 3.4–5)
ALBUMIN/GLOB SERPL: 1 {RATIO} (ref 1–2)
ALP LIVER SERPL-CCNC: 61 U/L
ALT SERPL-CCNC: 24 U/L
ANION GAP SERPL CALC-SCNC: 6 MMOL/L (ref 0–18)
AST SERPL-CCNC: 23 U/L (ref 15–37)
BASOPHILS # BLD AUTO: 0.03 X10(3) UL (ref 0–0.2)
BASOPHILS NFR BLD AUTO: 0.4 %
BILIRUB SERPL-MCNC: 0.3 MG/DL (ref 0.1–2)
BUN BLD-MCNC: 14 MG/DL (ref 7–18)
CALCIUM BLD-MCNC: 9.1 MG/DL (ref 8.5–10.1)
CHLORIDE SERPL-SCNC: 106 MMOL/L (ref 98–112)
CHOLEST SERPL-MCNC: 187 MG/DL (ref ?–200)
CO2 SERPL-SCNC: 27 MMOL/L (ref 21–32)
CREAT BLD-MCNC: 1.19 MG/DL
EOSINOPHIL # BLD AUTO: 0.33 X10(3) UL (ref 0–0.7)
EOSINOPHIL NFR BLD AUTO: 4.8 %
ERYTHROCYTE [DISTWIDTH] IN BLOOD BY AUTOMATED COUNT: 11.8 %
FASTING PATIENT LIPID ANSWER: YES
FASTING STATUS PATIENT QL REPORTED: YES
GLOBULIN PLAS-MCNC: 3.9 G/DL (ref 2.8–4.4)
GLUCOSE BLD-MCNC: 102 MG/DL (ref 70–99)
HCT VFR BLD AUTO: 41 %
HDLC SERPL-MCNC: 36 MG/DL (ref 40–59)
HGB BLD-MCNC: 13.8 G/DL
IMM GRANULOCYTES # BLD AUTO: 0.01 X10(3) UL (ref 0–1)
IMM GRANULOCYTES NFR BLD: 0.1 %
LDLC SERPL CALC-MCNC: 134 MG/DL (ref ?–100)
LYMPHOCYTES # BLD AUTO: 2.39 X10(3) UL (ref 1–4)
LYMPHOCYTES NFR BLD AUTO: 34.8 %
MCH RBC QN AUTO: 31.1 PG (ref 26–34)
MCHC RBC AUTO-ENTMCNC: 33.7 G/DL (ref 31–37)
MCV RBC AUTO: 92.3 FL
MONOCYTES # BLD AUTO: 0.41 X10(3) UL (ref 0.1–1)
MONOCYTES NFR BLD AUTO: 6 %
NEUTROPHILS # BLD AUTO: 3.7 X10 (3) UL (ref 1.5–7.7)
NEUTROPHILS # BLD AUTO: 3.7 X10(3) UL (ref 1.5–7.7)
NEUTROPHILS NFR BLD AUTO: 53.9 %
NONHDLC SERPL-MCNC: 151 MG/DL (ref ?–130)
OSMOLALITY SERPL CALC.SUM OF ELEC: 289 MOSM/KG (ref 275–295)
PLATELET # BLD AUTO: 300 10(3)UL (ref 150–450)
POTASSIUM SERPL-SCNC: 4.2 MMOL/L (ref 3.5–5.1)
PROT SERPL-MCNC: 7.9 G/DL (ref 6.4–8.2)
RBC # BLD AUTO: 4.44 X10(6)UL
SODIUM SERPL-SCNC: 139 MMOL/L (ref 136–145)
TRIGL SERPL-MCNC: 95 MG/DL (ref 30–149)
TSI SER-ACNC: 1.26 MIU/ML (ref 0.36–3.74)
VLDLC SERPL CALC-MCNC: 17 MG/DL (ref 0–30)
WBC # BLD AUTO: 6.9 X10(3) UL (ref 4–11)

## 2022-07-01 PROCEDURE — 80061 LIPID PANEL: CPT

## 2022-07-01 PROCEDURE — 85025 COMPLETE CBC W/AUTO DIFF WBC: CPT

## 2022-07-01 PROCEDURE — 80053 COMPREHEN METABOLIC PANEL: CPT

## 2022-07-01 PROCEDURE — 84443 ASSAY THYROID STIM HORMONE: CPT

## 2022-07-01 PROCEDURE — 36415 COLL VENOUS BLD VENIPUNCTURE: CPT

## (undated) DIAGNOSIS — R05.9 COUGH: ICD-10-CM

## (undated) DIAGNOSIS — Z91.09 ENVIRONMENTAL ALLERGIES: ICD-10-CM